# Patient Record
Sex: MALE | Race: WHITE | NOT HISPANIC OR LATINO | Employment: STUDENT | URBAN - METROPOLITAN AREA
[De-identification: names, ages, dates, MRNs, and addresses within clinical notes are randomized per-mention and may not be internally consistent; named-entity substitution may affect disease eponyms.]

---
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
## 2024-03-28 ENCOUNTER — ATHLETIC TRAINING SESSION (OUTPATIENT)
Dept: SPORTS MEDICINE | Facility: CLINIC | Age: 19
End: 2024-03-28
Payer: COMMERCIAL

## 2024-03-28 NOTE — PROGRESS NOTES
Subjective:       Chief Complaint: Aftab Patel is a 18 y.o. male student at [employer]) who had concerns including Pain of the Left Knee and Pain of the Right Knee.    Aftab has knee pain in both knees. Both present as patellar tendon pain and maybe some poor patella tracking.     Handedness: right-handed  Sport played: baseball      Level: college      Position:short stop      Pain        ROS              Objective:       General: Aftab is well-developed, well-nourished, appears stated age, in no acute distress, alert and oriented to time, place and person.     AT Session          Assessment:     Status: F - Full Participation    Date Seen:  3/20/24    Date of Injury:  Chronic    Date Out:  NA    Date Cleared:  NA      Plan:   Aftab completed:    [x]  INJURY TREATMENT   []  MAINTENANCE  DATE OF SERVICE: 3/21/24  INJURY/CONDITON: B knee     Aftab received the selected modalities after being cleared for contradictions.  Aftab received education on potenital side effects of the selected modalities and agreed to treatment.      MODALITIES:    Cryotherapy / Thermotherapy Duration  (Mins) Add. Tx Parameters / Comment   []Cold Tub / Whirlpool (50-60 F)     []Contrast Bath (105-110 F & 50-65 F)     []Game Ready     []Hot Pack     []Hot Tub / Whirlpool ( F)     []Ice Massage     []Ice Pack     []Paraffin Wax (126-130 F)     []Vapocoolant Spray        Comment:       Electrotherapy Waveform   (AC/DC) Modulation (Cont./Interrupted/Surged) Intensity   (V) Pulse Width/Dur.  (uS) Pulse Rate/Freq.  (Hz, PPS or CPS) Duration  (Mins) Add. Tx Parameters / Comment   []Combo          []E-Stim - IFC          []E-Stim - Premod          []E-Stim - Gabonese          []E-Stim - TENS          []E-Stim - Other          []Iontophoresis        Meds:     Comment:      Ultrasound Duty Cycle   (%) Freq.  (Mhz) Intensity   (w/cm2) Duration  (Mins) Add. Tx Parameters / Comment   []Combo        []Phonophoresis     Meds:   []Ultrasound          []Ultrasound and E-Stim          Comment:        Massage Duration  (Mins) Add. Tx Parameters / Comment   []Massage - IASTM     []Massage - Scar Tissue     []Massage - Self Administered     []Massage - Therapeutic     []Myofascial Release        Comment:      Other Modalities Duration  (Mins)  Add. Tx Parameters / Comment   []Active Release     []Cupping     []Dry Needling     []Intermittent Compression      []Laser     []Lightwave     []Traction      []Other:       Comment:      THERAPEUTIC EXERCISES:    Stretching Cardio Rehab Other   []Stretching - Active []Cardio - Bike []Rehab - Ankle/Foot []Agility []PNF   []Stretching - Dynamic []Cardio - Elliptical [x]Rehab - Knee []Balance []ROM - Active   []Stretching - Passive []Cardio - Jog/Run []Rehab - Hip []Blood Flow Restriction []ROM - Passive   []Stretching - PNF []Cardio - Treadmill []Rehab - Wrist/Hand []Foam Roller []RTP - Concussion Protocol   []Stretching - Static []Cardio - Upper Body Ergometer []Rehab - Elbow []Functional Exercises []RTP - Sport Specific    []Cardio - Walk []Rehab - Shoulder []Joint Mobilization []Strengthening Exercises     []Rehab - Neck/Spine []Manual Therapy []Other:     []Rehab - Back []Plyometric Exercises      []Rehab - Other       Comment:            Warm-Up Reps/Sets/Time Weight #                         Exercise Reps/Sets/Time Weight #   Short arc quad  3 x 10  3 lbs    TKE  3 x 10  Green heavy resistance band    Tibialis anterior raises  3 x 10     Tibial twist  3 x 10     Banded figure 4  3 x 30s                                Comment:      Miscellaneous Add. Tx Parameters / Comment   []Compression Wrap    []Support Wrap    []Taping - Preventative    []Taping - Injured Part    []Wound Care    []Other:      Comment:       Aftab completed:    [x]  INJURY TREATMENT   []  MAINTENANCE  DATE OF SERVICE: 3/20/24  INJURY/CONDITON: B knee    Aftab received the selected modalities after being cleared for contradictions.  Aftab received  education on potenital side effects of the selected modalities and agreed to treatment.      MODALITIES:    Cryotherapy / Thermotherapy Duration  (Mins) Add. Tx Parameters / Comment   []Cold Tub / Whirlpool (50-60 F)     []Contrast Bath (105-110 F & 50-65 F)     []Game Ready     []Hot Pack     []Hot Tub / Whirlpool ( F)     []Ice Massage     []Ice Pack     []Paraffin Wax (126-130 F)     []Vapocoolant Spray        Comment:       Electrotherapy Waveform   (AC/DC) Modulation (Cont./Interrupted/Surged) Intensity   (V) Pulse Width/Dur.  (uS) Pulse Rate/Freq.  (Hz, PPS or CPS) Duration  (Mins) Add. Tx Parameters / Comment   []Combo          []E-Stim - IFC          []E-Stim - Premod          []E-Stim - Peruvian          []E-Stim - TENS          []E-Stim - Other          []Iontophoresis        Meds:     Comment:      Ultrasound Duty Cycle   (%) Freq.  (Mhz) Intensity   (w/cm2) Duration  (Mins) Add. Tx Parameters / Comment   []Combo        []Phonophoresis     Meds:   []Ultrasound         []Ultrasound and E-Stim          Comment:        Massage Duration  (Mins) Add. Tx Parameters / Comment   []Massage - IASTM     []Massage - Scar Tissue     []Massage - Self Administered     []Massage - Therapeutic     []Myofascial Release        Comment:      Other Modalities Duration  (Mins)  Add. Tx Parameters / Comment   []Active Release     []Cupping     []Dry Needling     []Intermittent Compression      []Laser     []Lightwave     []Traction      []Other:       Comment:      THERAPEUTIC EXERCISES:    Stretching Cardio Rehab Other   []Stretching - Active []Cardio - Bike []Rehab - Ankle/Foot []Agility []PNF   []Stretching - Dynamic []Cardio - Elliptical [x]Rehab - Knee []Balance []ROM - Active   []Stretching - Passive []Cardio - Jog/Run []Rehab - Hip []Blood Flow Restriction []ROM - Passive   []Stretching - PNF []Cardio - Treadmill []Rehab - Wrist/Hand []Foam Roller []RTP - Concussion Protocol   []Stretching - Static []Cardio - Upper  Body Ergometer []Rehab - Elbow []Functional Exercises []RTP - Sport Specific    []Cardio - Walk []Rehab - Shoulder []Joint Mobilization []Strengthening Exercises     []Rehab - Neck/Spine []Manual Therapy []Other:     []Rehab - Back []Plyometric Exercises      []Rehab - Other       Comment:            Warm-Up Reps/Sets/Time Weight #                         Exercise Reps/Sets/Time Weight #   Short arc quad  3 x 10     TKE  3 x 10  Green heavy resistance band    Tibialis anterior raises  3 x 10     Duck walks  3 x 10                                     Comment:      Miscellaneous Add. Tx Parameters / Comment   []Compression Wrap    []Support Wrap    []Taping - Preventative    []Taping - Injured Part    []Wound Care    []Other:      Comment:

## 2024-04-08 ENCOUNTER — ATHLETIC TRAINING SESSION (OUTPATIENT)
Dept: SPORTS MEDICINE | Facility: CLINIC | Age: 19
End: 2024-04-08
Payer: COMMERCIAL

## 2024-04-08 DIAGNOSIS — G89.29 CHRONIC PAIN OF BOTH KNEES: Primary | ICD-10-CM

## 2024-04-08 DIAGNOSIS — M25.562 CHRONIC PAIN OF BOTH KNEES: Primary | ICD-10-CM

## 2024-04-08 DIAGNOSIS — M25.561 CHRONIC PAIN OF BOTH KNEES: Primary | ICD-10-CM

## 2024-04-08 NOTE — PROGRESS NOTES
Reason for Encounter Follow-Up    Subjective:       Chief Complaint: Aftab Patel is a 18 y.o. male student at [employer]) who had concerns including Pain of the Left Knee and Pain of the Right Knee.    Aftab has been having bilateral patellar tendon pain. He lifts pretty heavy in the gym and is lacking mobility in his hips. We are targeting accessory muscle strength, patellar tendon strength, and hip mobility.      Handedness: right-handed  Sport played: baseball      Level: college      Position:short stop      Pain        ROS              Objective:       General: Aftab is well-developed, well-nourished, appears stated age, in no acute distress, alert and oriented to time, place and person.     AT Session          Assessment:     Status: F - Full Participation    Date Seen:  4/5/24    Date of Injury:  Chronic    Date Out:  NA    Date Cleared:  NA      Plan:   Aftab completed:    [x]  INJURY TREATMENT   []  MAINTENANCE  DATE OF SERVICE: 4/5/24  INJURY/CONDITON: Bilateral knee pain    Aftab received the selected modalities after being cleared for contradictions.  Aftab received education on potenital side effects of the selected modalities and agreed to treatment.      MODALITIES:    Cryotherapy / Thermotherapy Duration  (Mins) Add. Tx Parameters / Comment   []Cold Tub / Whirlpool (50-60 F)     []Contrast Bath (105-110 F & 50-65 F)     []Game Ready     []Hot Pack     []Hot Tub / Whirlpool ( F)     []Ice Massage     []Ice Pack     []Paraffin Wax (126-130 F)     []Vapocoolant Spray        Comment:       Electrotherapy Waveform   (AC/DC) Modulation (Cont./Interrupted/Surged) Intensity   (V) Pulse Width/Dur.  (uS) Pulse Rate/Freq.  (Hz, PPS or CPS) Duration  (Mins) Add. Tx Parameters / Comment   []Combo          []E-Stim - IFC          []E-Stim - Premod          []E-Stim - [place]ian          []E-Stim - TENS          []E-Stim - Other          []Iontophoresis        Meds:     Comment:      Ultrasound Duty  Cycle   (%) Freq.  (Mhz) Intensity   (w/cm2) Duration  (Mins) Add. Tx Parameters / Comment   []Combo        []Phonophoresis     Meds:   []Ultrasound         []Ultrasound and E-Stim          Comment:        Massage Duration  (Mins) Add. Tx Parameters / Comment   []Massage - IASTM     []Massage - Scar Tissue     []Massage - Self Administered     []Massage - Therapeutic     []Myofascial Release        Comment:      Other Modalities Duration  (Mins)  Add. Tx Parameters / Comment   []Active Release     []Cupping     []Dry Needling     []Intermittent Compression      []Laser     []Lightwave     []Traction      []Other:       Comment:      THERAPEUTIC EXERCISES:    Stretching Cardio Rehab Other   []Stretching - Active []Cardio - Bike []Rehab - Ankle/Foot []Agility []PNF   []Stretching - Dynamic []Cardio - Elliptical [x]Rehab - Knee []Balance []ROM - Active   []Stretching - Passive []Cardio - Jog/Run []Rehab - Hip []Blood Flow Restriction []ROM - Passive   []Stretching - PNF []Cardio - Treadmill []Rehab - Wrist/Hand []Foam Roller []RTP - Concussion Protocol   []Stretching - Static []Cardio - Upper Body Ergometer []Rehab - Elbow []Functional Exercises []RTP - Sport Specific    []Cardio - Walk []Rehab - Shoulder []Joint Mobilization []Strengthening Exercises     []Rehab - Neck/Spine []Manual Therapy []Other:     []Rehab - Back []Plyometric Exercises      []Rehab - Other       Comment:            Warm-Up Reps/Sets/Time Weight #                         Exercise Reps/Sets/Time Weight #   Short arc quad  3 x 10  3 lbs    TKE 3 x 10  Blue heavy resistance band   Tibialis anterior raises  3 x 10     Duck walks  3 x loatr     Standing pigeon pose  3 x 30s                                Comment:      Miscellaneous Add. Tx Parameters / Comment   []Compression Wrap    []Support Wrap    []Taping - Preventative    []Taping - Injured Part    []Wound Care    []Other:      Comment:

## 2024-04-09 ENCOUNTER — ATHLETIC TRAINING SESSION (OUTPATIENT)
Dept: SPORTS MEDICINE | Facility: CLINIC | Age: 19
End: 2024-04-09
Payer: COMMERCIAL

## 2024-04-09 DIAGNOSIS — M25.561 CHRONIC PAIN OF BOTH KNEES: Primary | ICD-10-CM

## 2024-04-09 DIAGNOSIS — M25.562 CHRONIC PAIN OF BOTH KNEES: Primary | ICD-10-CM

## 2024-04-09 DIAGNOSIS — G89.29 CHRONIC PAIN OF BOTH KNEES: Primary | ICD-10-CM

## 2024-04-09 NOTE — PROGRESS NOTES
Reason for Encounter Follow-Up    Subjective:       Chief Complaint: Aftab Patel is a 18 y.o. male student at [employer]) who had concerns including Pain of the Right Knee and Pain of the Left Knee.    Aftab has bilateral patellar and quad tendon pain. We are doing rehab to help with knee pain     Handedness: right-handed  Sport played: baseball      Level: college      Position:short stop      Pain        ROS              Objective:       General: Aftab is well-developed, well-nourished, appears stated age, in no acute distress, alert and oriented to time, place and person.     AT Session          Assessment:     Status: F - Full Participation    Date Seen:  4/8/24    Date of Injury:  Chronic     Date Out:  NA    Date Cleared:  NA      Plan:         Aftab completed:    []  INJURY TREATMENT   [x]  MAINTENANCE  DATE OF SERVICE: 4/8/24  INJURY/CONDITON: Bilateral knee pain     Aftab received the selected modalities after being cleared for contradictions.  Aftab received education on potenital side effects of the selected modalities and agreed to treatment.      MODALITIES:    Cryotherapy / Thermotherapy Duration  (Mins) Add. Tx Parameters / Comment   []Cold Tub / Whirlpool (50-60 F)     []Contrast Bath (105-110 F & 50-65 F)     []Game Ready     []Hot Pack     []Hot Tub / Whirlpool ( F)     []Ice Massage     []Ice Pack     []Paraffin Wax (126-130 F)     []Vapocoolant Spray        Comment:       Electrotherapy Waveform   (AC/DC) Modulation (Cont./Interrupted/Surged) Intensity   (V) Pulse Width/Dur.  (uS) Pulse Rate/Freq.  (Hz, PPS or CPS) Duration  (Mins) Add. Tx Parameters / Comment   []Combo          []E-Stim - IFC          []E-Stim - Premod          []E-Stim - Estonian          []E-Stim - TENS          []E-Stim - Other          []Iontophoresis        Meds:     Comment:      Ultrasound Duty Cycle   (%) Freq.  (Mhz) Intensity   (w/cm2) Duration  (Mins) Add. Tx Parameters / Comment   []Combo         []Phonophoresis     Meds:   []Ultrasound         []Ultrasound and E-Stim          Comment:        Massage Duration  (Mins) Add. Tx Parameters / Comment   []Massage - IASTM     []Massage - Scar Tissue     []Massage - Self Administered     []Massage - Therapeutic     []Myofascial Release        Comment:      Other Modalities Duration  (Mins)  Add. Tx Parameters / Comment   []Active Release     []Cupping     []Dry Needling     []Intermittent Compression      []Laser     []Lightwave     []Traction      []Other:       Comment:      THERAPEUTIC EXERCISES:    Stretching Cardio Rehab Other   []Stretching - Active []Cardio - Bike []Rehab - Ankle/Foot []Agility []PNF   []Stretching - Dynamic []Cardio - Elliptical []Rehab - Knee []Balance []ROM - Active   []Stretching - Passive []Cardio - Jog/Run [x]Rehab - Hip []Blood Flow Restriction []ROM - Passive   []Stretching - PNF []Cardio - Treadmill []Rehab - Wrist/Hand []Foam Roller []RTP - Concussion Protocol   []Stretching - Static []Cardio - Upper Body Ergometer []Rehab - Elbow []Functional Exercises []RTP - Sport Specific    []Cardio - Walk []Rehab - Shoulder []Joint Mobilization []Strengthening Exercises     []Rehab - Neck/Spine []Manual Therapy []Other:     []Rehab - Back []Plyometric Exercises      []Rehab - Other       Comment:            Warm-Up Reps/Sets/Time Weight #                         Exercise Reps/Sets/Time Weight #   Short arc quad  3 x 10  4 lbs    TKE  3 x 10  Green heavy resistance band    Tibialis anterior raises  3 x 12     Duck walks  3 x LOATR    Tibial twist  3 x 10     Standing pigeon pose  3 x 30s     90* knee opens  2 x 10     90* ankle opens  2 x 10     Kneeling open gate  2 x 10  1 lbs    90/90s  2 x 10       Comment:      Miscellaneous Add. Tx Parameters / Comment   []Compression Wrap    []Support Wrap    []Taping - Preventative    []Taping - Injured Part    []Wound Care    []Other:      Comment:

## 2024-04-30 ENCOUNTER — ATHLETIC TRAINING SESSION (OUTPATIENT)
Dept: SPORTS MEDICINE | Facility: CLINIC | Age: 19
End: 2024-04-30
Payer: COMMERCIAL

## 2024-04-30 DIAGNOSIS — M25.562 CHRONIC PAIN OF BOTH KNEES: Primary | ICD-10-CM

## 2024-04-30 DIAGNOSIS — M25.561 CHRONIC PAIN OF BOTH KNEES: Primary | ICD-10-CM

## 2024-04-30 DIAGNOSIS — G89.29 CHRONIC PAIN OF BOTH KNEES: Primary | ICD-10-CM

## 2024-04-30 NOTE — PROGRESS NOTES
Reason for Encounter Follow-Up    Subjective:       Chief Complaint: Aftab Patel is a 18 y.o. male student at [employer]) who had concerns including Pain of the Right Knee and Pain of the Left Knee.    Aftab has bilateral knee pain from playing sports and lifting. We are doing rehab to help strengthen structures surrounding the knee.     Handedness: right-handed  Sport played: baseball      Level: college      Position:short stop      Pain      ROS              Objective:       General: Aftab is well-developed, well-nourished, appears stated age, in no acute distress, alert and oriented to time, place and person.     AT Session  Aftab completed:    [x]  INJURY TREATMENT   []  MAINTENANCE  DATE OF SERVICE: 4/26/24  INJURY/CONDITON: B knee    Aftab received the selected modalities after being cleared for contradictions.  Aftab received education on potenital side effects of the selected modalities and agreed to treatment.      MODALITIES:    Cryotherapy / Thermotherapy Duration  (Mins) Add. Tx Parameters / Comment   []Cold Tub / Whirlpool (50-60 F)     []Contrast Bath (105-110 F & 50-65 F)     []Game Ready     []Hot Pack     []Hot Tub / Whirlpool ( F)     []Ice Massage     []Ice Pack     []Paraffin Wax (126-130 F)     []Vapocoolant Spray        Comment:       Electrotherapy Waveform   (AC/DC) Modulation (Cont./Interrupted/Surged) Intensity   (V) Pulse Width/Dur.  (uS) Pulse Rate/Freq.  (Hz, PPS or CPS) Duration  (Mins) Add. Tx Parameters / Comment   []Combo          []E-Stim - IFC          []E-Stim - Premod          []E-Stim - Burmese          []E-Stim - TENS          []E-Stim - Other          []Iontophoresis        Meds:     Comment:      Ultrasound Duty Cycle   (%) Freq.  (Mhz) Intensity   (w/cm2) Duration  (Mins) Add. Tx Parameters / Comment   []Combo        []Phonophoresis     Meds:   []Ultrasound         []Ultrasound and E-Stim          Comment:        Massage Duration  (Mins) Add. Tx Parameters /  Comment   []Massage - IASTM     []Massage - Scar Tissue     []Massage - Self Administered     []Massage - Therapeutic     []Myofascial Release        Comment:      Other Modalities Duration  (Mins)  Add. Tx Parameters / Comment   []Active Release     []Cupping     []Dry Needling     []Intermittent Compression      []Laser     []Lightwave     []Traction      []Other:       Comment:      THERAPEUTIC EXERCISES:    Stretching Cardio Rehab Other   []Stretching - Active []Cardio - Bike []Rehab - Ankle/Foot []Agility []PNF   []Stretching - Dynamic []Cardio - Elliptical [x]Rehab - Knee []Balance []ROM - Active   []Stretching - Passive []Cardio - Jog/Run [x]Rehab - Hip []Blood Flow Restriction []ROM - Passive   []Stretching - PNF []Cardio - Treadmill []Rehab - Wrist/Hand []Foam Roller []RTP - Concussion Protocol   []Stretching - Static []Cardio - Upper Body Ergometer []Rehab - Elbow []Functional Exercises []RTP - Sport Specific    []Cardio - Walk []Rehab - Shoulder []Joint Mobilization []Strengthening Exercises     []Rehab - Neck/Spine []Manual Therapy []Other:     []Rehab - Back []Plyometric Exercises      []Rehab - Other       Comment:            Warm-Up Reps/Sets/Time Weight #                         Exercise Reps/Sets/Time Weight #   Duck walks  3 x LOATR    Kneeling open gate  2 x 10  1 lb    90/90s  2 x 10     Groin rocks  2 x 10  20 lbs                                    Comment:      Miscellaneous Add. Tx Parameters / Comment   []Compression Wrap    []Support Wrap    []Taping - Preventative    []Taping - Injured Part    []Wound Care    []Other:      Comment:         Aftab completed:    [x]  INJURY TREATMENT   []  MAINTENANCE  DATE OF SERVICE: 4/25/24  INJURY/CONDITON: B knee pain     Aftab received the selected modalities after being cleared for contradictions.  Aftab received education on potenital side effects of the selected modalities and agreed to treatment.      MODALITIES:    Cryotherapy / Thermotherapy  Duration  (Mins) Add. Tx Parameters / Comment   []Cold Tub / Whirlpool (50-60 F)     []Contrast Bath (105-110 F & 50-65 F)     []Game Ready     []Hot Pack     []Hot Tub / Whirlpool ( F)     []Ice Massage     []Ice Pack     []Paraffin Wax (126-130 F)     []Vapocoolant Spray        Comment:       Electrotherapy Waveform   (AC/DC) Modulation (Cont./Interrupted/Surged) Intensity   (V) Pulse Width/Dur.  (uS) Pulse Rate/Freq.  (Hz, PPS or CPS) Duration  (Mins) Add. Tx Parameters / Comment   []Combo          []E-Stim - IFC          []E-Stim - Premod          []E-Stim - Omani          []E-Stim - TENS          []E-Stim - Other          []Iontophoresis        Meds:     Comment:      Ultrasound Duty Cycle   (%) Freq.  (Mhz) Intensity   (w/cm2) Duration  (Mins) Add. Tx Parameters / Comment   []Combo        []Phonophoresis     Meds:   []Ultrasound         []Ultrasound and E-Stim          Comment:        Massage Duration  (Mins) Add. Tx Parameters / Comment   []Massage - IASTM     []Massage - Scar Tissue     []Massage - Self Administered     []Massage - Therapeutic     []Myofascial Release        Comment:      Other Modalities Duration  (Mins)  Add. Tx Parameters / Comment   []Active Release     []Cupping     []Dry Needling     []Intermittent Compression      []Laser     []Lightwave     []Traction      []Other:       Comment:      THERAPEUTIC EXERCISES:    Stretching Cardio Rehab Other   []Stretching - Active []Cardio - Bike []Rehab - Ankle/Foot []Agility []PNF   []Stretching - Dynamic []Cardio - Elliptical [x]Rehab - Knee []Balance []ROM - Active   []Stretching - Passive []Cardio - Jog/Run [x]Rehab - Hip []Blood Flow Restriction []ROM - Passive   []Stretching - PNF []Cardio - Treadmill []Rehab - Wrist/Hand []Foam Roller []RTP - Concussion Protocol   []Stretching - Static []Cardio - Upper Body Ergometer []Rehab - Elbow []Functional Exercises []RTP - Sport Specific    []Cardio - Walk []Rehab - Shoulder []Joint Mobilization  []Strengthening Exercises     []Rehab - Neck/Spine []Manual Therapy []Other:     []Rehab - Back []Plyometric Exercises      []Rehab - Other       Comment:            Warm-Up Reps/Sets/Time Weight #                         Exercise Reps/Sets/Time Weight #   Standing pigeon pose  3 x 30s     90* knee opens  3 x 10     90* ankle opens  3 x 10     Kneeling open murphy  2 x 10  1 lbs    Groin rocks  2 x 10  10 lbs                               Comment:      Miscellaneous Add. Tx Parameters / Comment   []Compression Wrap    []Support Wrap    []Taping - Preventative    []Taping - Injured Part    []Wound Care    []Other:      Comment:       Aftab completed:    [x]  INJURY TREATMENT   []  MAINTENANCE  DATE OF SERVICE: 4/23/24  INJURY/CONDITON: B knee pain     Aftab received the selected modalities after being cleared for contradictions.  Aftab received education on potenital side effects of the selected modalities and agreed to treatment.      MODALITIES:    Cryotherapy / Thermotherapy Duration  (Mins) Add. Tx Parameters / Comment   []Cold Tub / Whirlpool (50-60 F)     []Contrast Bath (105-110 F & 50-65 F)     []Game Ready     []Hot Pack     []Hot Tub / Whirlpool ( F)     []Ice Massage     []Ice Pack     []Paraffin Wax (126-130 F)     []Vapocoolant Spray        Comment:       Electrotherapy Waveform   (AC/DC) Modulation (Cont./Interrupted/Surged) Intensity   (V) Pulse Width/Dur.  (uS) Pulse Rate/Freq.  (Hz, PPS or CPS) Duration  (Mins) Add. Tx Parameters / Comment   []Combo          []E-Stim - IFC          []E-Stim - Premod          []E-Stim - Hong Konger          []E-Stim - TENS          []E-Stim - Other          []Iontophoresis        Meds:     Comment:      Ultrasound Duty Cycle   (%) Freq.  (Mhz) Intensity   (w/cm2) Duration  (Mins) Add. Tx Parameters / Comment   []Combo        []Phonophoresis     Meds:   []Ultrasound         []Ultrasound and E-Stim          Comment:        Massage Duration  (Mins) Add. Tx Parameters /  Comment   []Massage - IASTM     []Massage - Scar Tissue     []Massage - Self Administered     []Massage - Therapeutic     []Myofascial Release        Comment:      Other Modalities Duration  (Mins)  Add. Tx Parameters / Comment   []Active Release     []Cupping     []Dry Needling     []Intermittent Compression      []Laser     []Lightwave     []Traction      []Other:       Comment:      THERAPEUTIC EXERCISES:    Stretching Cardio Rehab Other   []Stretching - Active []Cardio - Bike []Rehab - Ankle/Foot []Agility []PNF   []Stretching - Dynamic []Cardio - Elliptical []Rehab - Knee []Balance []ROM - Active   []Stretching - Passive []Cardio - Jog/Run [x]Rehab - Hip []Blood Flow Restriction []ROM - Passive   []Stretching - PNF []Cardio - Treadmill []Rehab - Wrist/Hand []Foam Roller []RTP - Concussion Protocol   []Stretching - Static []Cardio - Upper Body Ergometer []Rehab - Elbow []Functional Exercises []RTP - Sport Specific    []Cardio - Walk []Rehab - Shoulder []Joint Mobilization []Strengthening Exercises     []Rehab - Neck/Spine []Manual Therapy []Other:     []Rehab - Back []Plyometric Exercises      []Rehab - Other       Comment:            Warm-Up Reps/Sets/Time Weight #                         Exercise Reps/Sets/Time Weight #   Short arc quad  3 x 10  4 lbs    Duck walks  3 x LOATR     Standing pigeon pose  3 x 30s     Kneeling open gate  2 x 10  1 lbs    Groin rocks  2 x 10  10 lbs    Seated leg raise  3 x 8                           Comment:      Miscellaneous Add. Tx Parameters / Comment   []Compression Wrap    []Support Wrap    []Taping - Preventative    []Taping - Injured Part    []Wound Care    []Other:      Comment:     Assessment:     Status: F - Full Participation    Date Seen:  4/23, 4/25, 4/26    Date of Injury:  Chronic    Date Out:  NA    Date Cleared:  NA      Plan:       1. Continue rehab as needed.   2. Physician Referral: no  3. ED Referral:no  4. Parent/Guardian Notified: No  5. All questions were  answered, ath. will contact me for questions or concerns in  the interim.  6.         Eligible to use School Insurance: Yes

## 2025-03-24 ENCOUNTER — ATHLETIC TRAINING SESSION (OUTPATIENT)
Dept: SPORTS MEDICINE | Facility: CLINIC | Age: 20
End: 2025-03-24
Payer: COMMERCIAL

## 2025-03-24 DIAGNOSIS — M25.512 CHRONIC LEFT SHOULDER PAIN: Primary | ICD-10-CM

## 2025-03-24 DIAGNOSIS — G89.29 CHRONIC LEFT SHOULDER PAIN: Primary | ICD-10-CM

## 2025-03-24 NOTE — PROGRESS NOTES
Reason for Encounter New Injury    Subjective:       Chief Complaint: Aftab Patel is a 19 y.o. male student at [employer]) who had concerns including Pain of the Left Shoulder.    Aftab presented in the ATR complaining of pain of his left shoulder. He said he's had issues with it since he was in high school. Aftab has talked to me about it before but did not want to do anything about it. He thought it was a labrum tear but never had it looked at by anyone. It has been bothering him more recently and he agreed to come in and work on it after I evaled it.     Handedness: right-handed  Sport played: baseball      Level: college      Position:short stop      Pain        ROS              Objective:       General: Aftab is well-developed, well-nourished, appears stated age, in no acute distress, alert and oriented to time, place and person.             Right Shoulder Exam   Right shoulder exam is normal.    Left Shoulder Exam     Inspection/Observation   Swelling: absent  Bruising: absent  Scars: absent  Deformity: absent  Scapular Winging: absent  Scapular Dyskinesia: negative  Atrophy: absent    Tenderness   The patient is experiencing no tenderness.     Range of Motion   Active abduction:  normal   Passive abduction:  normal   Extension:  normal   Forward Flexion:  normal   Forward Elevation: normal  Adduction: normal  External Rotation 0 degrees:  abnormal   External Rotation 90 degrees: abnormal  Internal rotation 0 degrees:  abnormal   Internal rotation 90 degrees:  abnormal     Muscle Strength   The patient has normal left shoulder strength.    Tests & Signs   Apprehension: negative  Cross arm: negative  Drop arm: negative  Blanca test: negative  Impingement: positive  Sulcus: absent  Rotator Cuff Painful Arc/Range: mild  Lag Sign 0 degrees: negative  Lag Sign 90 degrees: negative  Lift Off Sign: negative  Belly Press: negative  Active Compression Test (Belden's Sign): positive  Yergasons's Test:  negative  Relocation 90 degrees: negative  Relocation > 90 degrees: negative  Bear Hug: negative  Moving Valgus: negative  Jerk Test: negative    Other   Sensation: normal               Assessment:     Status: F - Full Participation    Date Seen:  03/19/2025    Date of Injury:  03/19/2025    Date Out:  NA    Date Cleared:  NA        Treatment/Rehab/Maintenance:           Plan:       1. Come in for arm care at least 3 x a week. Appears to be a rotator cuff pathology due to location of pain. If pain does not improve over time we will discus sending him to be seen in clinic.  2. Physician Referral: no  3. ED Referral:no  4. Parent/Guardian Notified: No  5. All questions were answered, ath. will contact me for questions or concerns in  the interim.  6.         Eligible to use School Insurance: Yes

## 2025-03-26 ENCOUNTER — ATHLETIC TRAINING SESSION (OUTPATIENT)
Dept: SPORTS MEDICINE | Facility: CLINIC | Age: 20
End: 2025-03-26
Payer: COMMERCIAL

## 2025-03-26 DIAGNOSIS — M25.512 CHRONIC LEFT SHOULDER PAIN: Primary | ICD-10-CM

## 2025-03-26 DIAGNOSIS — G89.29 CHRONIC LEFT SHOULDER PAIN: Primary | ICD-10-CM

## 2025-03-26 NOTE — PROGRESS NOTES
Reason for Encounter Follow-Up    Subjective:       Chief Complaint: Aftab Patel is a 19 y.o. male student at [employer]) who had concerns including Pain of the Left Shoulder.    Aftab is a  at [employer]. He came in the ATR complaining of shoulder pain. From his evaluation we will be treating him for a rotator cuff pathology.     Handedness: right-handed  Sport played: baseball      Level: college      Position:short stop      Pain        ROS              Objective:       General: Aftab is well-developed, well-nourished, appears stated age, in no acute distress, alert and oriented to time, place and person.     AT Session          Assessment:     Status: F - Full Participation    Date Seen:  03/16/2025-03/22/2025    Date of Injury:  03/19/2025    Date Out:  NA    Date Cleared:  NA        Treatment/Rehab/Maintenance:     Aftab completed therapeutic exercises to develop strength and ROM:    Date: 03/21/2025    Exercises  Sets x Reps  Weight   Shoulder CARs 2 x 10     Shoulder to ground  2 x 10     Band taps  3 x 10  Red theraband    Band C climbs  3 x 10  Red theraband    Quadruped KB CARs  3 x 10  15 lbs    Prone I, Y, T  3 x 8  2 lbs                                     Plan:       1. Come in 3 x a week for rehab at minimum. Treatment as needed and as seen fit.   2. Physician Referral: no  3. ED Referral:no  4. Parent/Guardian Notified: No  5. All questions were answered, ath. will contact me for questions or concerns in  the interim.  6.         Eligible to use School Insurance: Yes

## 2025-03-26 NOTE — PROGRESS NOTES
Reason for Encounter Follow-Up    Subjective:       Chief Complaint: Aftab Patel is a 19 y.o. male student at [employer]) who had concerns including Pain of the Left Shoulder.    Aftab is a  at [employer]. He came in the ATR complaining of shoulder pain. From his evaluation we will be treating him for a rotator cuff pathology.     Handedness: right-handed  Sport played: baseball      Level: college      Position:short stop      Pain        ROS              Objective:       General: Aftab is well-developed, well-nourished, appears stated age, in no acute distress, alert and oriented to time, place and person.     AT Session          Assessment:     Status: F - Full Participation    Date Seen:  03/23/2025-03/29/2025    Date of Injury:  03/19/2025    Date Out:  NA    Date Cleared:  NA        Treatment/Rehab/Maintenance:     Aftab completed therapeutic exercises to develop strength and ROM:    Date: 03/26/2025    Exercises  Sets x Reps  Weight   Shoulder CARs 2 x 10     Shoulder to ground  2 x 10     Band taps  3 x 10  Red theraband    Band C climbs  3 x 10  Red theraband    Quadruped KB CARs  3 x 10  15 lbs    Prone I, Y, T  3 x 8  2 lbs                             Modality: Cupping   Date 03/26/2025, Body Location R trap, and Duration 5 min    Modality: IASTM  Date 03/26/2025, Body Location R bicep, and Duration 5 min                Plan:       1. Come in 3 x a week for rehab at minimum. Treatment as needed and as seen fit.   2. Physician Referral: no  3. ED Referral:no  4. Parent/Guardian Notified: No  5. All questions were answered, ath. will contact me for questions or concerns in  the interim.  6.         Eligible to use School Insurance: Yes

## 2025-04-08 ENCOUNTER — ATHLETIC TRAINING SESSION (OUTPATIENT)
Dept: SPORTS MEDICINE | Facility: CLINIC | Age: 20
End: 2025-04-08
Payer: COMMERCIAL

## 2025-04-08 DIAGNOSIS — G89.29 CHRONIC LEFT SHOULDER PAIN: Primary | ICD-10-CM

## 2025-04-08 DIAGNOSIS — M25.512 CHRONIC LEFT SHOULDER PAIN: Primary | ICD-10-CM

## 2025-04-08 DIAGNOSIS — M79.621 PAIN IN RIGHT UPPER ARM: ICD-10-CM

## 2025-04-08 NOTE — PROGRESS NOTES
Reason for Encounter Follow-Up    Subjective:       Chief Complaint: Aftab Patel is a 19 y.o. male student at [employer]) who had concerns including Pain of the Left Shoulder and Pain of the Right Upper Arm.    Aftab is a  at [employer]. He has recently been complaining of left shoulder pain and right bicep tendonitis pain. He has had both on and off through out high school. We are doing rehab to address both issues.     Handedness: right-handed  Sport played: baseball      Level: college      Position:outfield      Pain      ROS              Objective:       General: Aftab is well-developed, well-nourished, appears stated age, in no acute distress, alert and oriented to time, place and person.     AT Session          Assessment:     Status: F - Full Participation    Date Seen:  04/06/2025-04/12/2025    Date of Injury:  03/19/2025    Date Out:  NA    Date Cleared:  NA        Treatment/Rehab/Maintenance:   Aftab completed therapeutic exercises to develop strength and ROM:    Date: 04/07/2025    Exercises  Sets x Reps  Weight   Shoulder CARs  2 x 10     Shoulder to ground  2 x 10     Door frame stretch  2 x 30s     Quadruped Men's Style Lab CARs  2 x 10  15 lbs    Prone I, Y, T  2 x 10  2 lbs    Ball ABCs  2 x ABC    Bicep ISO  2 x 30s     Tricep ISO  2 x 30s                    Modality: IASTM  Date 04/07/2025, Body Location R forearm and bicep, and Duration 5 min          Aftab completed therapeutic exercises to develop strength and ROM:    Date: 04/06/2025    Exercises  Sets x Reps  Weight   Shoulder CARs  2 x 10     Shoulder to ground  2 x 10     Door frame stretch  2 x 30s     Quadruped KB CARs  2 x 10  15 lbs    Prone I, Y, T  2 x 10  2 lbs    Ball ABCs  2 x ABC    Bicep ISO  2 x 30s     Tricep ISO  2 x 30s                    Modality: Compex  Date 04/06/2025, Training Recovery, Body Location R lateral elbow, and Duration 25 min              Plan:       1. Come in daily for rehab and treatment. Once  he is seeing improvement we can drop to 3 times a week. If either problem does not improve we will discuss getting him into the clinic.   2. Physician Referral: no  3. ED Referral:no  4. Parent/Guardian Notified: No  5. All questions were answered, ath. will contact me for questions or concerns in  the interim.  6.         Eligible to use School Insurance: Yes

## 2025-04-08 NOTE — PROGRESS NOTES
Reason for Encounter Follow-Up    Subjective:       Chief Complaint: Aftab Patel is a 19 y.o. male student at [employer]) who had concerns including Pain of the Left Shoulder and Pain of the Right Upper Arm.    Aftab is a  at [employer]. He has recently been complaining of left shoulder pain and right bicep tendonitis pain. He has had both on and off through out high school. We are doing rehab to address both issues.     Handedness: right-handed  Sport played: baseball      Level: college      Position:outfield      Pain        ROS              Objective:       General: Aftab is well-developed, well-nourished, appears stated age, in no acute distress, alert and oriented to time, place and person.     AT Session          Assessment:     Status: F - Full Participation    Date Seen:  03/30/2025-04/05/2025    Date of Injury:  03/19/2025    Date Out:  NA    Date Cleared:  NA        Treatment/Rehab/Maintenance:     Aftab completed therapeutic exercises to develop strength and ROM:    Date: 04/05/2025    Exercises  Sets x Reps  Weight   Shoulder CARs  2 x 10     Finger flexion  3 x 10  Blue finger web   Finger extension  3 x 10  Blue finger web    Wrist extension  2 x 10  8 lbs    Wrist flexion  2 x 10  8 lbs   Radial and ulnar deviation  2 x 10  8 lbs    Door frame stretch  2 x 30s     A pull backs  2 x 10  Silver theraband                           Plan:       1. Come in daily for rehab and treatment. Once he is seeing improvement we can drop to 3 times a week. If either problem does not improve we will discuss getting him into the clinic.   2. Physician Referral: no  3. ED Referral:no  4. Parent/Guardian Notified: No  5. All questions were answered, ath. will contact me for questions or concerns in  the interim.  6.         Eligible to use School Insurance: Yes

## 2025-06-16 ENCOUNTER — ATHLETIC TRAINING SESSION (OUTPATIENT)
Dept: SPORTS MEDICINE | Facility: CLINIC | Age: 20
End: 2025-06-16
Payer: COMMERCIAL

## 2025-06-16 DIAGNOSIS — M25.512 CHRONIC LEFT SHOULDER PAIN: Primary | ICD-10-CM

## 2025-06-16 DIAGNOSIS — G89.29 CHRONIC LEFT SHOULDER PAIN: Primary | ICD-10-CM

## 2025-06-16 NOTE — PROGRESS NOTES
Reason for Encounter Follow-Up    Subjective:       Chief Complaint: Aftab Patel is a 19 y.o. male student at [employer]) who had concerns including Pain of the Left Shoulder.    Aftab is a college  who just finished at [employer]. He was having left shoulder pain during the season. During exit physicals Aftab did not report having any pain or injury. For all purposes his L shoulder is considered resolved.     Handedness: right-handed  Sport played: baseball      Level: college      Position:short stop      Pain        ROS              Objective:       General: Aftab is well-developed, well-nourished, appears stated age, in no acute distress, alert and oriented to time, place and person.     AT Session          Assessment:     Status: F - Full Participation    Date Seen: 5/22/2025    Date of Injury: 03/19/2025    Date Out: NA    Date Cleared: NA        Treatment/Rehab/Maintenance:           Plan:       1. No further rehab necessary. Aftab is not complaining of pain and is not returning next year.   2. Physician Referral: no  3. ED Referral:no  4. Parent/Guardian Notified: No  5. All questions were answered, ath. will contact me for questions or concerns in  the interim.  6.         Eligible to use School Insurance: Yes

## 2025-06-30 ENCOUNTER — ATHLETIC TRAINING SESSION (OUTPATIENT)
Dept: SPORTS MEDICINE | Facility: CLINIC | Age: 20
End: 2025-06-30
Payer: COMMERCIAL

## 2025-06-30 DIAGNOSIS — Z00.00 HEALTHCARE MAINTENANCE: Primary | ICD-10-CM

## 2025-07-01 NOTE — PROGRESS NOTES
Reason for Encounter N/A    Subjective:       Chief Complaint: Aftab Patel is a 19 y.o. male student at [employer]) who had concerns including Health Maintenance of the Right Upper Arm.    Aftab is a  who finished his final season with [employer]. He is playing in the summer league and is getting treatment for soreness from pitching.     Handedness: right-handed  Sport played: baseball      Level: college      Position:pitcher          ROS              Objective:       General: Aftab is well-developed, well-nourished, appears stated age, in no acute distress, alert and oriented to time, place and person.     AT Session          Assessment:     Status: F - Full Participation    Date Seen: 06/20/2025    Date of Injury: NA    Date Out: NA    Date Cleared: NA        Treatment/Rehab/Maintenance:       Modality: IASTM  Date: 06/20/2025 Body Part: R upper arm Duration: 5 min         Plan:       1. Come in for treatment as needed and work on shoulder mobility.   2. Physician Referral: no  3. ED Referral:no  4. Parent/Guardian Notified: No  5. All questions were answered, ath. will contact me for questions or concerns in  the interim.  6.         Eligible to use School Insurance: No, not a school related injury

## 2025-07-11 ENCOUNTER — ATHLETIC TRAINING SESSION (OUTPATIENT)
Dept: SPORTS MEDICINE | Facility: CLINIC | Age: 20
End: 2025-07-11
Payer: COMMERCIAL

## 2025-07-11 DIAGNOSIS — Z00.00 HEALTHCARE MAINTENANCE: Primary | ICD-10-CM

## 2025-07-11 NOTE — PROGRESS NOTES
Reason for Encounter N/A    Subjective:       Chief Complaint: Aftab Patel is a 19 y.o. male student at [employer]) who had concerns including Health Maintenance of the Right Upper Arm.    Aftab is a  who just finished playing for [employer]. He is playing in a summer league hosted at [employer]. He is coming in to take care of his arm because he is starting to pitch.     Handedness: right-handed  Sport played: baseball      Level: college      Position:pitcher          ROS              Objective:       General: Aftab is well-developed, well-nourished, appears stated age, in no acute distress, alert and oriented to time, place and person.     AT Session          Assessment:     Status: F - Full Participation    Date Seen: 06/29/2025-07/05/2025    Date of Injury: NA    Date Out: NA    Date Cleared: NA        Treatment/Rehab/Maintenance:   Modality: IASTM   Date: 07/05/2025 Body Part: R arm Duration: 5 min   Modality: Massage  Date: 07/05/2025 Body Part: R arm Duration: 10 min           Aftab completed therapeutic exercises to develop ROM:    Date: 07/02/2025    Exercises  Sets x Reps  Weight   Pass throughs  2 x 10     Door frame stretch  2 x 30s     Upper extremity nerve flosses 2 x 10     Behind the back lift offs  3 x 10  Green theraband    Wall lift offs  3 x 10  Red theraband                                  Modality: Massage  Date: 07/02/2025 Body Part: R upper arm Duration: 10 min         Aftab completed therapeutic exercises to develop ROM:    Date: 06/30/2025    Exercises  Sets x Reps  Weight   Pass throughs  2 x 10     Door frame stretch  2 x 30s     Upper extremity nerve flosses 2 x 10     Behind the back lift offs  3 x 10  Green theraband    Wall lift offs  3 x 10  Red theraband                                  Modality: Cupping  Date: 06/30/2025 Body Part: Upper back Duration: 10 min   Modality: IASTM  Date: 06/30/2025 Body Part: R bicep Duration: 5  min               Plan:       1. Come in for arm care and treatment 3 times a week.   2. Physician Referral: no  3. ED Referral:no  4. Parent/Guardian Notified: No  5. All questions were answered, ath. will contact me for questions or concerns in  the interim.  6.         Eligible to use School Insurance: No, not a school related injury

## 2025-07-11 NOTE — PROGRESS NOTES
Reason for Encounter N/A    Subjective:       Chief Complaint: Aftab Patel is a 19 y.o. male student at [employer]) who had concerns including Health Maintenance of the Right Upper Arm.    Aftab is a  who just finished playing for [employer]. He is playing in a summer league hosted at [employer]. He is coming in to take care of his arm because he is starting to pitch.     Handedness: right-handed  Sport played: baseball      Level: college      Position:pitcher          ROS              Objective:       General: Aftab is well-developed, well-nourished, appears stated age, in no acute distress, alert and oriented to time, place and person.     AT Session          Assessment:     Status: F - Full Participation    Date Seen: 07/06/2025-07/12/2025    Date of Injury: NA    Date Out: NA    Date Cleared: NA        Treatment/Rehab/Maintenance:   Aftab completed therapeutic exercises to develop ROM:    Date: 07/11/2025    Exercises  Sets x Reps  Weight   Pass throughs  2 x 10     Door frame stretch  2 x 30s     Upper extremity nerve flosses 2 x 10     Behind the back lift offs  3 x 10  Green theraband    Wall lift offs  3 x 10  Red theraband                                  Modality: IASTM  Date: 07/11/2025 Body Part: R arm Duration: 5 min         Aftab completed therapeutic exercises to develop ROM:    Date: 07/10/2025    Exercises  Sets x Reps  Weight   Pass throughs  2 x 10     Door frame stretch  2 x 30s     Upper extremity nerve flosses 2 x 10     Behind the back lift offs  3 x 10  Green theraband    Wall lift offs  3 x 10  Red theraband                                  Modality: Massage  Date: 07/10/2025 Body Part: R upper arm Duration: 10 min   Modality: IASTM  Date: 07/10/2025 Body Part: R upper arm Duration: 5 min               Plan:       1. Come in for arm care and treatment 3 times a week.   2. Physician Referral: no  3. ED Referral:no  4. Parent/Guardian  Notified: No  5. All questions were answered, ath. will contact me for questions or concerns in  the interim.  6.         Eligible to use School Insurance: No, not a school related injury

## 2025-07-25 ENCOUNTER — ATHLETIC TRAINING SESSION (OUTPATIENT)
Dept: SPORTS MEDICINE | Facility: CLINIC | Age: 20
End: 2025-07-25
Payer: COMMERCIAL

## 2025-07-25 DIAGNOSIS — Z00.00 HEALTHCARE MAINTENANCE: Primary | ICD-10-CM

## 2025-07-25 NOTE — PROGRESS NOTES
Reason for Encounter N/A    Subjective:       Chief Complaint: Aftab Patel is a 19 y.o. male student at [employer]) who had concerns including Health Maintenance of the Right Upper Arm.    Aftab is a  who just finished playing for [employer]. He is playing in a summer league hosted at [employer]. He is coming in to take care of his arm because he is starting to pitch.     Handedness: right-handed  Sport played: baseball      Level: college      Position:pitcher          ROS              Objective:       General: Aftab is well-developed, well-nourished, appears stated age, in no acute distress, alert and oriented to time, place and person.     AT Session          Assessment:     Status: F - Full Participation    Date Seen: 07/13/2025-07/19/2025    Date of Injury: NA    Date Out: NA    Date Cleared: NA        Treatment/Rehab/Maintenance:   Aftab completed therapeutic exercises to develop ROM:    Date: 07/18/2025    Exercises  Sets x Reps  Weight   Pass throughs  2 x 10     Around the worlds 2 x 10     Door frame stretch  2 x 30s     Upper extremity nerve flosses 2 x 10     Wrist stretches  2 x 30s                                  Aftab completed therapeutic exercises to develop ROM:    Date: 07/17/2025    Exercises  Sets x Reps  Weight   Pass throughs  2 x 10     Door frame stretch  2 x 30s     Upper extremity nerve flosses 2 x 10     Behind the back lift offs  3 x 10  Green theraband    Wall lift offs  3 x 10  Red theraband    Wrist stretches  2 x 30s                              Modality: Massage  Date: 07/17/2025 Body Part: R arm Duration: 10 min         Aftab completed therapeutic exercises to develop ROM:    Date: 07/16/2025    Exercises  Sets x Reps  Weight   Pass throughs  2 x 10     Door frame stretch  2 x 30s     Upper extremity nerve flosses 2 x 10     Behind the back lift offs  3 x 10  Green theraband    Wall lift offs  3 x 10  Red theraband    Wrist  stretches  2 x 30s                              Modality: Normatec  Date 07/16/2025, Arms, and Duration 15 min   Modality: Massage  Date: 07/16/2025 Body Part: R arm Duration: 10 min         Aftab completed therapeutic exercises to develop ROM:    Date: 07/14/2025    Exercises  Sets x Reps  Weight   Pass throughs  2 x 10     Door frame stretch  2 x 30s     Upper extremity nerve flosses 2 x 10     Wrist stretches  2 x 30s                                       Modality: Normatec  Date 07/14/2025, Arms, and Duration 15 min   Modality: Cupping  Date: 07/14/2025 Body Part: Posterior R shoulder Duration: 10 min   Modality: IASTM  Date: 07/14/2025 Body Part: R arm Duration: 5 min           Plan:       1. Come in for arm care and treatment 3 times a week.   2. Physician Referral: no  3. ED Referral:no  4. Parent/Guardian Notified: No  5. All questions were answered, ath. will contact me for questions or concerns in  the interim.  6.         Eligible to use School Insurance: No, not a school related injury

## 2025-07-28 ENCOUNTER — ATHLETIC TRAINING SESSION (OUTPATIENT)
Dept: SPORTS MEDICINE | Facility: CLINIC | Age: 20
End: 2025-07-28
Payer: COMMERCIAL

## 2025-07-28 DIAGNOSIS — Z00.00 HEALTHCARE MAINTENANCE: Primary | ICD-10-CM

## 2025-07-28 NOTE — PROGRESS NOTES
Reason for Encounter N/A    Subjective:       Chief Complaint: Aftab Patel is a 19 y.o. male student at [employer]) who had concerns including Health Maintenance of the Right Upper Arm.    Aftab is a  who just finished playing for [employer]. He is playing in a summer league hosted at [employer]. He is coming in to take care of his arm because he is starting to pitch.     Handedness: right-handed  Sport played: baseball      Level: college      Position:pitcher          ROS              Objective:       General: Aftab is well-developed, well-nourished, appears stated age, in no acute distress, alert and oriented to time, place and person.     AT Session          Assessment:     Status: F - Full Participation    Date Seen: 07/20/2025-07/26/2025    Date of Injury: NA    Date Out: NA    Date Cleared: NA        Treatment/Rehab/Maintenance:   Aftab completed therapeutic exercises to develop ROM:    Date: 07/24/2025    Exercises  Sets x Reps  Weight   Pass throughs  2 x 10     Around the worlds 2 x 10     Door frame stretch  2 x 30s     Upper extremity nerve flosses 2 x 10     Wrist stretches  2 x 30s     Prone ER tosses 3 x 10  1.1 lbs   Prone snow angle toss 3 x 5  1.1 lbs                  Modality: Normatec  Date 07/24/2025, R Arm, and Duration 15 min   Modality: IASTM  Date: 07/24/2025 Body Part: R arm Duration: 5 min           Aftab completed therapeutic exercises to develop ROM:    Date: 07/23/2025    Exercises  Sets x Reps  Weight   Pass throughs  2 x 10     Around the worlds 2 x 10     Door frame stretch  2 x 30s     Upper extremity nerve flosses 2 x 10     Wrist stretches  2 x 30s                                  Aftab completed therapeutic exercises to develop ROM:    Date: 07/22/2025    Exercises  Sets x Reps  Weight   Pass throughs  2 x 10     Around the worlds 2 x 10     Door frame stretch  2 x 30s     Upper extremity nerve flosses 2 x 10     Wrist  stretches  2 x 30s                              Modality: Cupping  Date: 07/22/2025 Body Part: Posterior R shoulder Duration: 10 min   Modality: IASTM  Date: 07/22/2025 Body Part: R arm Duration: 5 min       Aftab completed therapeutic exercises to develop ROM:    Date: 07/21/2025    Exercises  Sets x Reps  Weight   Pass throughs  2 x 10     Around the worlds 2 x 10     Door frame stretch  2 x 30s     Upper extremity nerve flosses 2 x 10     Wrist stretches  2 x 30s     Prone ER tosses 3 x 10  1.1 lbs   Prone snow angle toss 3 x 5  1.1 lbs                  Modality: Massage  Date: 07/21/2025 Body Part: R arm Duration: 10 min         Plan:       1. Come in for arm care and treatment 3 times a week.   2. Physician Referral: no  3. ED Referral:no  4. Parent/Guardian Notified: No  5. All questions were answered, ath. will contact me for questions or concerns in  the interim.  6.         Eligible to use School Insurance: No, not a school related injury

## 2025-07-31 ENCOUNTER — ATHLETIC TRAINING SESSION (OUTPATIENT)
Dept: SPORTS MEDICINE | Facility: CLINIC | Age: 20
End: 2025-07-31
Payer: COMMERCIAL

## 2025-07-31 DIAGNOSIS — Z00.00 HEALTHCARE MAINTENANCE: Primary | ICD-10-CM
